# Patient Record
Sex: MALE | Race: WHITE
[De-identification: names, ages, dates, MRNs, and addresses within clinical notes are randomized per-mention and may not be internally consistent; named-entity substitution may affect disease eponyms.]

---

## 2022-07-27 ENCOUNTER — APPOINTMENT (OUTPATIENT)
Dept: ORTHOPEDIC SURGERY | Facility: CLINIC | Age: 52
End: 2022-07-27

## 2022-07-27 VITALS — BODY MASS INDEX: 20.46 KG/M2 | HEIGHT: 68 IN | WEIGHT: 135 LBS

## 2022-07-27 DIAGNOSIS — Z00.00 ENCOUNTER FOR GENERAL ADULT MEDICAL EXAMINATION W/OUT ABNORMAL FINDINGS: ICD-10-CM

## 2022-07-27 DIAGNOSIS — M75.41 IMPINGEMENT SYNDROME OF RIGHT SHOULDER: ICD-10-CM

## 2022-07-27 DIAGNOSIS — Z78.9 OTHER SPECIFIED HEALTH STATUS: ICD-10-CM

## 2022-07-27 DIAGNOSIS — M25.811 OTHER SPECIFIED JOINT DISORDERS, RIGHT SHOULDER: ICD-10-CM

## 2022-07-27 DIAGNOSIS — Z98.890 OTHER SPECIFIED POSTPROCEDURAL STATES: ICD-10-CM

## 2022-07-27 PROCEDURE — 73010 X-RAY EXAM OF SHOULDER BLADE: CPT | Mod: RT

## 2022-07-27 PROCEDURE — 99204 OFFICE O/P NEW MOD 45 MIN: CPT

## 2022-07-27 PROCEDURE — 73030 X-RAY EXAM OF SHOULDER: CPT | Mod: RT

## 2022-07-27 NOTE — REASON FOR VISIT
[FreeTextEntry2] : 51 yo RHD m ([employer]) with right shoulder pain that started in 2016 while carrying heavy grocery bags. Pain is to the posterior shoulder and radiates to the neck at times. Pain with activity.  Pain with OH reaching. There can be night pain.  He had multiple opinions from different physicians in [place] where he lives. Prior right shoulder RTC repair in 2017 (Dr Bajwa) with no relief of his pain. He sees Dr Grimaldo where a repeat MRI was done and he was given the diagnosis of suprascapular nerve entrapment. CSI right shoulder done in winter of 2019 with some relief. He has had intermittent pain for years. Worse pain since Spring 2022 after doing exercises for golf. He had a repeat CSI to the right shoulder in 6/2022 with some improvement. An EMG was inconclusive. MRI C-spine and MRI T-spine he reports were unremarkable.  An MRI was done in 6/2022. He presents for a second opinion. \par Prior right shoulder AC joint separation in 2008 that healed uneventfully with PT.

## 2022-07-27 NOTE — ASSESSMENT
[FreeTextEntry1] : We discussed the underlying pathology. \par We discussed the MRI findings. \par Treatment options reviewed. \par Sleeper stretch demonstrated. \par We had a lengthy discussion regarding his course. I don’t think it is SNSES. I would not plan for an EMG based on his good quality muscle. \par An injection is indicated, he will hold off for now. \par PT is prescribed. \par If symptoms persist, there are surgical options. \par Aleve use discussed. \par Cautions discussed. \par Questions answered. \par \par Patient seen by Santos David M.D.\par Entered by Katelyn Brunson acting as scribe. \par \par

## 2022-07-27 NOTE — HISTORY OF PRESENT ILLNESS
[4] : 4 [Intermittent] : intermittent [Household chores] : household chores [Leisure] : leisure [Sleep] : sleep [Full time] : Work status: full time [de-identified] : Right shoulder pain for years. Here for a 2nd opinion. Started in 2016 after lifting heavy bags. [] : no [FreeTextEntry1] : right shoulder [FreeTextEntry6] : soreness [FreeTextEntry7] : radiates down the side of scapula down the rib a little [FreeTextEntry9] : cortisone injection [de-identified] : 2017-present [de-identified] : Dr Bajwa, Dr Grimaldo [de-identified] : 10/2017 [de-identified] : MRI

## 2022-07-27 NOTE — IMAGING
[Right] : right shoulder [FreeTextEntry1] : The GH joint in intact. Mild GH degenerative changes. AC joint degenerative changes.  [FreeTextEntry5] : Type 1 Acromion.

## 2022-07-27 NOTE — PHYSICAL EXAM
[Right] : right shoulder [Moderate] : moderate [5___] : external rotation 5[unfilled]/5 [5 ___] : forward flexion 5[unfilled]/5 [Left] : left shoulder [Sitting] : sitting [] : no atrophy [FreeTextEntry8] : There is no lesser tuberosity tenderness.  [de-identified] : Bellypress is negative.  [FreeTextEntry9] : IR to T8. [TWNoteComboBox7] : False [TWNoteComboBox6] : internal rotation L1 [TWNoteComboBox4] : passive forward flexion 160 degrees [de-identified] : external rotation 60 degrees [TWNoteComboBox5] : internal rotation at 90 degrees of abduction 70 degrees

## 2022-07-27 NOTE — DATA REVIEWED
[MRI] : MRI [Right] : of the right [Shoulder] : shoulder [I independently reviewed and interpreted images and report] : I independently reviewed and interpreted images and report [I reviewed the films/CD and agree] : I reviewed the films/CD and agree [FreeTextEntry1] : Right Shoulder MRI 5/29/22:\par \par There is AC deformity and spur. The one anchor is in good position. The repair is good. The biceps tendon is good. There is good muscle.